# Patient Record
(demographics unavailable — no encounter records)

---

## 2024-10-13 NOTE — HISTORY OF PRESENT ILLNESS
[Breastfeeding] : not currently nursing [Abdominal Pain] : no abdominal pain [Breast Pain] : no breast pain [BreastFeeding Problems] : no breastfeeding problems [Chest Pain] : no chest pain [Cracked Nipples] : no cracked nipples [S/Sx PP Depression] : no signs/symptoms of postpartum depression [Episiotomy Site Pain] : no episiotomy site pain [Heavy Bleeding] : no heavy bleeding [Incisional Pain] : no incisional pain [Irregular Bleeding] : no irregular bleeding [Leg Pain] : no leg pain [Shortness of Breath] : no shortness of breath [Suicidal Ideation] : no suicidal ideation [Vaginal Discharge] : no vaginal discharge [Chills] : no chills [Fatigue] : no fatigue [Dysuria] : no dysuria [Fever] : no fever [Headache] : no headache [Vomiting] : no vomiting [Clean/Dry/Intact] : clean, dry and intact [Healed] : healed [None] : no vaginal bleeding [Cervix Sample Taken] : cervical sample not taken for a Pap smear [Examination Of The Breasts] : breasts are normal [Doing Well] : is doing well [No Sign of Infection] : is showing no signs of infection [Fair Pain Control] : has fair pain control [FreeTextEntry9] : CHTN S/I PEC w/SF C/s at 29 weeks, baby is currently at [facility] NICU [de-identified] : Follow up for annual exam

## 2024-10-13 NOTE — HISTORY OF PRESENT ILLNESS
[Breastfeeding] : not currently nursing [Abdominal Pain] : no abdominal pain [Breast Pain] : no breast pain [BreastFeeding Problems] : no breastfeeding problems [Chest Pain] : no chest pain [Cracked Nipples] : no cracked nipples [S/Sx PP Depression] : no signs/symptoms of postpartum depression [Episiotomy Site Pain] : no episiotomy site pain [Heavy Bleeding] : no heavy bleeding [Incisional Pain] : no incisional pain [Irregular Bleeding] : no irregular bleeding [Leg Pain] : no leg pain [Shortness of Breath] : no shortness of breath [Suicidal Ideation] : no suicidal ideation [Vaginal Discharge] : no vaginal discharge [Chills] : no chills [Fatigue] : no fatigue [Dysuria] : no dysuria [Fever] : no fever [Headache] : no headache [Vomiting] : no vomiting [Clean/Dry/Intact] : clean, dry and intact [Healed] : healed [None] : no vaginal bleeding [Cervix Sample Taken] : cervical sample not taken for a Pap smear [Examination Of The Breasts] : breasts are normal [Doing Well] : is doing well [No Sign of Infection] : is showing no signs of infection [Fair Pain Control] : has fair pain control [FreeTextEntry9] : CHTN S/I PEC w/SF C/s at 29 weeks, baby is currently at [facility] NICU [de-identified] : Follow up for annual exam

## 2024-11-25 NOTE — DISCUSSION/SUMMARY
[FreeTextEntry1] : Patient is a 34 year old  5months pregnant woman with h/o sciatica presented to follow up  in setting of elevated BP (3/2024 140/ office BP with OBGYN). Complaints of 2 months of Intermittent chest tightness 5-9/10 that can occur without trigger and sometimes with stairs, exacerbated by stress, goes away with rest after seconds to minutes. Associated with hand tingling, SOB and lightheadedness, blurry visions. Denies headache, dysphagia. Had lab works 2024 sCr 0.5, urinalysis negative for protein.  PMH: none Allergy: NKDA Meds: PNV  Surgery: Appendectomy Social:  Family history: HTN in father, DM in mother, no MI in family  Interval note 24 on 9/3/24 had about 3 pre-TIA like symptoms - did not go to the ER since her family told her she was being dramatic went to the OB that day - found to have elevated BPs/PEC - was sent to the hospital CHTN S/I PEC w/SF C/s at 29 weeks, baby was at [facility] NICU for a while - now home  delivered by  section on 2024 - discharged on Procardia and labetalol and she self-discontinued slowly  Current Status: She is not currently nursing.   BP stable here in the office today  BP at home 120-135/70-80   - Preeclampsia - BP borderline at home - Encouraged the patient to monitor blood pressure at home, keep a log, and report results back to us for evaluation. Based on results, we will adjust the regimen as necessary. - will c/w the current regimen consisting of - patient is currently not breastfeeding - will refer for an echocardiogram to assess for diastolic issues - will refer for an exercise stress test in 3-6 months post delivery - will refer for routine fasting blood work in 3-6 months post delivery - low sodium diet recommended - once cleared by OBGYN advised to start walking/light exercise ~ 30 min/day - hydration advised [EKG obtained to assist in diagnosis and management of assessed problem(s)] : EKG obtained to assist in diagnosis and management of assessed problem(s)

## 2024-11-25 NOTE — HISTORY OF PRESENT ILLNESS
[FreeTextEntry1] : Patient is a 34 year old  5months pregnant woman with h/o sciatica presented to follow up  in setting of elevated BP (3/2024 140/ office BP with OBGYN). Complaints of 2 months of Intermittent chest tightness 5-9/10 that can occur without trigger and sometimes with stairs, exacerbated by stress, goes away with rest after seconds to minutes. Associated with hand tingling, SOB and lightheadedness, blurry visions. Denies headache, dysphagia. Had lab works 2024 sCr 0.5, urinalysis negative for protein.  PMH: none Allergy: NKDA Meds: PNV  Surgery: Appendectomy Social:  Family history: HTN in father, DM in mother, no MI in family  Interval note 24 on 9/3/24 had about 3 pre-TIA like symptoms - did not go to the ER since her family told her she was being dramatic went to the OB that day - found to have elevated BPs/PEC - was sent to the hospital CHTN S/I PEC w/SF C/s at 29 weeks, baby was at [facility] NICU for a while - now home  delivered by  section on 2024 - discharged on Procardia and labetalol and she self-discontinued slowly  Current Status: She is not currently nursing.   BP stable here in the office today  BP at home 120-135/70-80

## 2025-02-27 NOTE — HISTORY OF PRESENT ILLNESS
[FreeTextEntry1] : Patient is a 34 year old  5months pregnant woman with h/o sciatica presented to follow up  in setting of elevated BP (3/2024 140/ office BP with OBGYN). Complaints of 2 months of Intermittent chest tightness 5-9/10 that can occur without trigger and sometimes with stairs, exacerbated by stress, goes away with rest after seconds to minutes. Associated with hand tingling, SOB and lightheadedness, blurry visions. Denies headache, dysphagia. Had lab works 2024 sCr 0.5, urinalysis negative for protein.  PMH: none Allergy: NKDA Meds: PNV  Surgery: Appendectomy Social:  Family history: HTN in father, DM in mother, no MI in family  Interval note 25  on 9/3/24 had about 3 pre-TIA like symptoms - did not go to the ER since her family told her she was being dramatic went to the OB that day - found to have elevated BPs/PEC - was sent to the hospital CHTN S/I PEC w/SF C/s at 29 weeks, baby was at [facility] NICU for a while - now home  delivered by  section on 2024 - discharged on Procardia and labetalol and she self-discontinued slowly  Current Status: She is not currently nursing.   BP stable here in the office today  BP at home 120-135/70-80 looking for a therapist due to her seasonal affective disorder and maybe some post partum issues

## 2025-02-27 NOTE — DISCUSSION/SUMMARY
[FreeTextEntry1] : Patient is a 34 year old  5months pregnant woman with h/o sciatica presented to follow up  in setting of elevated BP (3/2024 140/ office BP with OBGYN). Complaints of 2 months of Intermittent chest tightness 5-9/10 that can occur without trigger and sometimes with stairs, exacerbated by stress, goes away with rest after seconds to minutes. Associated with hand tingling, SOB and lightheadedness, blurry visions. Denies headache, dysphagia. Had lab works 2024 sCr 0.5, urinalysis negative for protein.  PMH: none Allergy: NKDA Meds: PNV  Surgery: Appendectomy Social:  Family history: HTN in father, DM in mother, no MI in family  Interval note 25  on 9/3/24 had about 3 pre-TIA like symptoms - did not go to the ER since her family told her she was being dramatic went to the OB that day - found to have elevated BPs/PEC - was sent to the hospital CHTN S/I PEC w/SF C/s at 29 weeks, baby was at [facility] NICU for a while - now home  delivered by  section on 2024 - discharged on Procardia and labetalol and she self-discontinued slowly  Current Status: She is not currently nursing.   BP stable here in the office today  BP at home 120-135/70-80 looking for a therapist due to her seasonal affective disorder and maybe some post partum issues   - Preeclampsia - BP borderline at home - Encouraged the patient to monitor blood pressure at home, keep a log, and report results back to us for evaluation. Based on results, we will adjust the regimen as necessary. - will c/w the current regimen consisting of - patient is currently not breastfeeding - will refer for an echocardiogram to assess for diastolic issues - will refer for an exercise stress test in 3-6 months post delivery - will refer for routine fasting blood work in 3-6 months post delivery - low sodium diet recommended - once cleared by OBGYN advised to start walking/light exercise ~ 30 min/day - hydration advised echo 25 CONCLUSIONS:   1. Left ventricular cavity is normal in size. Left ventricular wall thickness is normal. Left ventricular systolic function is normal with an ejection fraction of 56 % by Francis's method of disks. There are no regional wall motion abnormalities seen.  2. Normal left ventricular diastolic function, with normal left ventricular filling pressure.  3. Normal right ventricular cavity size, with normal wall thickness, and normal right ventricular systolic function. Tricuspid annular plane systolic excursion (TAPSE) is 2.2 cm (normal >=1.7 cm).  4. No pericardial effusion seen.  5. No prior echocardiogram is available for comparison. [EKG obtained to assist in diagnosis and management of assessed problem(s)] : EKG obtained to assist in diagnosis and management of assessed problem(s)